# Patient Record
Sex: FEMALE | Race: BLACK OR AFRICAN AMERICAN | URBAN - METROPOLITAN AREA
[De-identification: names, ages, dates, MRNs, and addresses within clinical notes are randomized per-mention and may not be internally consistent; named-entity substitution may affect disease eponyms.]

---

## 2021-11-10 ENCOUNTER — OUT OF OFFICE VISIT (OUTPATIENT)
Dept: URBAN - METROPOLITAN AREA MEDICAL CENTER 12 | Facility: MEDICAL CENTER | Age: 21
End: 2021-11-10
Payer: COMMERCIAL

## 2021-11-10 DIAGNOSIS — R10.31 ABDOMINAL CRAMPING IN RIGHT LOWER QUADRANT: ICD-10-CM

## 2021-11-10 DIAGNOSIS — K50.90 ABDOMINAL PAIN DESPITE THERAPY FOR CROHN'S DISEASE: ICD-10-CM

## 2021-11-10 DIAGNOSIS — R93.3 ABN FINDINGS-GI TRACT: ICD-10-CM

## 2021-11-10 PROCEDURE — 99232 SBSQ HOSP IP/OBS MODERATE 35: CPT | Performed by: INTERNAL MEDICINE

## 2021-11-10 PROCEDURE — 99254 IP/OBS CNSLTJ NEW/EST MOD 60: CPT | Performed by: INTERNAL MEDICINE

## 2021-11-13 ENCOUNTER — OUT OF OFFICE VISIT (OUTPATIENT)
Dept: URBAN - METROPOLITAN AREA MEDICAL CENTER 12 | Facility: MEDICAL CENTER | Age: 21
End: 2021-11-13
Payer: COMMERCIAL

## 2021-11-13 DIAGNOSIS — R10.84 ABDOMINAL CRAMPING, GENERALIZED: ICD-10-CM

## 2021-11-13 DIAGNOSIS — K50.90 ABDOMINAL PAIN DESPITE THERAPY FOR CROHN'S DISEASE: ICD-10-CM

## 2021-11-13 DIAGNOSIS — R93.3 ABN FINDINGS-GI TRACT: ICD-10-CM

## 2021-11-13 PROCEDURE — 99232 SBSQ HOSP IP/OBS MODERATE 35: CPT | Performed by: INTERNAL MEDICINE

## 2021-11-13 PROCEDURE — 99231 SBSQ HOSP IP/OBS SF/LOW 25: CPT | Performed by: INTERNAL MEDICINE

## 2021-11-17 ENCOUNTER — TELEPHONE ENCOUNTER (OUTPATIENT)
Dept: URBAN - METROPOLITAN AREA CLINIC 98 | Facility: CLINIC | Age: 21
End: 2021-11-17

## 2021-11-17 ENCOUNTER — WEB ENCOUNTER (OUTPATIENT)
Dept: URBAN - METROPOLITAN AREA CLINIC 96 | Facility: CLINIC | Age: 21
End: 2021-11-17

## 2021-11-17 ENCOUNTER — OFFICE VISIT (OUTPATIENT)
Dept: URBAN - METROPOLITAN AREA CLINIC 98 | Facility: CLINIC | Age: 21
End: 2021-11-17
Payer: COMMERCIAL

## 2021-11-17 ENCOUNTER — OFFICE VISIT (OUTPATIENT)
Dept: URBAN - METROPOLITAN AREA CLINIC 96 | Facility: CLINIC | Age: 21
End: 2021-11-17

## 2021-11-17 VITALS
SYSTOLIC BLOOD PRESSURE: 119 MMHG | WEIGHT: 98 LBS | HEART RATE: 106 BPM | DIASTOLIC BLOOD PRESSURE: 83 MMHG | BODY MASS INDEX: 15.75 KG/M2 | TEMPERATURE: 98.6 F | HEIGHT: 66 IN

## 2021-11-17 DIAGNOSIS — K59.01 CONSTIPATION: ICD-10-CM

## 2021-11-17 DIAGNOSIS — R93.5 ABNORMAL ABDOMINAL CT SCAN: ICD-10-CM

## 2021-11-17 DIAGNOSIS — K50.813 CROHN'S DISEASE OF BOTH SMALL AND LARGE INTESTINE WITH FISTULA: ICD-10-CM

## 2021-11-17 DIAGNOSIS — R11.2 NAUSEA & VOMITING: ICD-10-CM

## 2021-11-17 PROCEDURE — 99205 OFFICE O/P NEW HI 60 MIN: CPT | Performed by: INTERNAL MEDICINE

## 2021-11-17 RX ORDER — FERROUS GLUCONATE 324 MG
1 TABLET WITH WATER OR JUICE BETWEEN MEALS TABLET ORAL ONCE A DAY
Status: ACTIVE | COMMUNITY

## 2021-11-17 RX ORDER — PREDNISONE 10 MG/1
1 TABLET TABLET ORAL ONCE A DAY
Status: ACTIVE | COMMUNITY

## 2021-11-17 RX ORDER — TRAMADOL HYDROCHLORIDE 50 MG/1
1 TABLET AS NEEDED TABLET, FILM COATED ORAL ONCE A DAY
Status: ACTIVE | COMMUNITY

## 2021-11-17 RX ORDER — FOLIC ACID 1 MG/1
1 TABLET TABLET ORAL ONCE A DAY
Qty: 30 | Refills: 11 | OUTPATIENT
Start: 2021-11-17 | End: 2022-11-12

## 2021-11-17 RX ORDER — METHOTREXATE SODIUM 2.5 MG/1
5 TAB TABLET ORAL WEEKLY
Qty: 20 | Refills: 4 | OUTPATIENT
Start: 2021-11-17

## 2021-11-17 NOTE — HPI-TODAY'S VISIT:
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 . Pt is diagnosed with CD in Aug 2021 in [place].  She was started on prednisone taper and planned to start Inflectra in 9/2021 and last dose in 10/29/2021.  She was hospitalized for a flare and was given IV steroids at this time.   . Today on 11/17/2021, pt reports that she has abdominal pain, diffuse in nature, nothing makes it better or worse.  She has nausea, but no vomiting.  Weight loss of 4 lbs (100lb to 96lb).  Has lot of constipation, has up to 1-2 BM per week   CT 11/2021: thickening of the ileum and hyperenhacement with luminal narrowing. Possible fistulous communicatin not exlcluded.   . Colonoscopy ([facility]) 9/2021: patchy colitis throughout with narrowing seen; severe ulceration in the ileum.  Biopsies only taken from colon: colitis seen, none taken from ileum.  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 11/2021: Hgb 12.9 . Fecal calprotectin >2000

## 2021-12-20 ENCOUNTER — TELEPHONE ENCOUNTER (OUTPATIENT)
Dept: URBAN - METROPOLITAN AREA CLINIC 92 | Facility: CLINIC | Age: 21
End: 2021-12-20

## 2021-12-20 ENCOUNTER — OFFICE VISIT (OUTPATIENT)
Dept: URBAN - METROPOLITAN AREA TELEHEALTH 2 | Facility: TELEHEALTH | Age: 21
End: 2021-12-20
Payer: COMMERCIAL

## 2021-12-20 DIAGNOSIS — R93.5 ABNORMAL ABDOMINAL CT SCAN: ICD-10-CM

## 2021-12-20 DIAGNOSIS — K50.813 CROHN'S DISEASE OF BOTH SMALL AND LARGE INTESTINE WITH FISTULA: ICD-10-CM

## 2021-12-20 DIAGNOSIS — K59.01 CONSTIPATION: ICD-10-CM

## 2021-12-20 PROCEDURE — 99215 OFFICE O/P EST HI 40 MIN: CPT | Performed by: INTERNAL MEDICINE

## 2021-12-20 RX ORDER — TRAMADOL HYDROCHLORIDE 50 MG/1
1 TABLET AS NEEDED TABLET, FILM COATED ORAL ONCE A DAY
Status: ACTIVE | COMMUNITY

## 2021-12-20 RX ORDER — FOLIC ACID 1 MG/1
1 TABLET TABLET ORAL ONCE A DAY
Qty: 30 | Refills: 11 | OUTPATIENT

## 2021-12-20 RX ORDER — METHOTREXATE SODIUM 2.5 MG/1
5 TAB TABLET ORAL WEEKLY
Qty: 20 | Refills: 4 | Status: ACTIVE | COMMUNITY
Start: 2021-11-17

## 2021-12-20 RX ORDER — FERROUS GLUCONATE 324 MG
1 TABLET WITH WATER OR JUICE BETWEEN MEALS TABLET ORAL ONCE A DAY
Status: ACTIVE | COMMUNITY

## 2021-12-20 RX ORDER — INFLIXIMAB 100 MG/10ML
AS DIRECTED INJECTION, POWDER, LYOPHILIZED, FOR SOLUTION INTRAVENOUS
Qty: 100 MILLIGRAMS | Refills: 0 | OUTPATIENT
Start: 2021-12-20 | End: 2022-01-19

## 2021-12-20 RX ORDER — PREDNISONE 10 MG/1
1 TABLET TABLET ORAL ONCE A DAY
Status: ACTIVE | COMMUNITY

## 2021-12-20 RX ORDER — FOLIC ACID 1 MG/1
1 TABLET TABLET ORAL ONCE A DAY
Qty: 30 | Refills: 11 | Status: ACTIVE | COMMUNITY
Start: 2021-11-17 | End: 2022-11-12

## 2021-12-20 RX ORDER — METHOTREXATE SODIUM 2.5 MG/1
5 TAB TABLET ORAL WEEKLY
Qty: 20 | Refills: 4 | OUTPATIENT

## 2021-12-20 NOTE — HPI-TODAY'S VISIT:
Avinash Maurice is a 19 y/o indiv with ileal CD, currently on inflectra and MTX (12.5mg weekly started 11/2021), here for evalaution of refractory dz. . Pt is diagnosed with CD in Aug 2021 in [place].  She was started on prednisone taper and planned to start Inflectra in 9/2021 and last dose in 10/29/2021.  She was hospitalized for a flare and was given IV steroids at this time.  She had right sided resection performed on 12/2021. . Previously on 11/17/2021, pt reports that she has abdominal pain, diffuse in nature, nothing makes it better or worse.  She has nausea, but no vomiting.  Weight loss of 4 lbs (100lb to 96lb).  Has lot of constipation, has up to 1-2 BM per week  . Today on 12/20/2021, pt reports that she had surgery last week and is doing very good.  Is discharged from hospital.  Is still having some pain since surgery.  Had 1 BM today.  No blood in the stool. . CT 11/2021: thickening of the ileum and hyperenhacement with luminal narrowing. Possible fistulous communicatin not exlcluded.   . Colonoscopy ([facility]) 9/2021: patchy colitis throughout with narrowing seen; severe ulceration in the ileum.  Biopsies only taken from colon: colitis seen, none taken from ileum.  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 11/2021: Hgb 12.9 . Fecal calprotectin >2000 .

## 2022-02-03 LAB
A/G RATIO: 1.6
ALBUMIN: 4.6
ALKALINE PHOSPHATASE: 87
ALT (SGPT): 6
AST (SGOT): 12
BASO (ABSOLUTE): 0
BASOS: 1
BILIRUBIN, TOTAL: 0.3
BUN/CREATININE RATIO: 17
BUN: 12
CALCIUM: 9.1
CARBON DIOXIDE, TOTAL: 20
CHLORIDE: 99
CREATININE: 0.7
EGFR IF AFRICN AM: 144
EGFR IF NONAFRICN AM: 125
EOS (ABSOLUTE): 0.2
EOS: 3
GLOBULIN, TOTAL: 2.8
GLUCOSE: 74
HBSAG SCREEN: NEGATIVE
HEMATOCRIT: 36.7
HEMATOLOGY COMMENTS:: (no result)
HEMOGLOBIN: 11.4
HEP B CORE AB, TOT: NEGATIVE
HEPATITIS B SURF AB QUANT: 3.9
IMMATURE CELLS: (no result)
IMMATURE GRANS (ABS): 0
IMMATURE GRANULOCYTES: 0
LYMPHS (ABSOLUTE): 1.7
LYMPHS: 27
MCH: 26.3
MCHC: 31.1
MCV: 85
MONOCYTES(ABSOLUTE): 0.5
MONOCYTES: 7
NEUTROPHILS (ABSOLUTE): 3.8
NEUTROPHILS: 62
NRBC: (no result)
PLATELETS: 414
POTASSIUM: 4.2
PROTEIN, TOTAL: 7.4
QUANTIFERON CRITERIA: (no result)
QUANTIFERON INCUBATION: (no result)
QUANTIFERON MITOGEN VALUE: >10
QUANTIFERON NIL VALUE: 0.06
QUANTIFERON TB1 AG VALUE: 0.07
QUANTIFERON TB2 AG VALUE: 0.07
QUANTIFERON-TB GOLD PLUS: NEGATIVE
RBC: 4.34
RDW: 14.8
SODIUM: 138
WBC: 6.2

## 2022-02-17 ENCOUNTER — OFFICE VISIT (OUTPATIENT)
Dept: URBAN - METROPOLITAN AREA CLINIC 97 | Facility: CLINIC | Age: 22
End: 2022-02-17
Payer: COMMERCIAL

## 2022-02-17 ENCOUNTER — TELEPHONE ENCOUNTER (OUTPATIENT)
Dept: URBAN - METROPOLITAN AREA CLINIC 97 | Facility: CLINIC | Age: 22
End: 2022-02-17

## 2022-02-17 VITALS
DIASTOLIC BLOOD PRESSURE: 74 MMHG | WEIGHT: 102 LBS | HEART RATE: 87 BPM | BODY MASS INDEX: 16.39 KG/M2 | SYSTOLIC BLOOD PRESSURE: 111 MMHG | RESPIRATION RATE: 16 BRPM | HEIGHT: 66 IN | TEMPERATURE: 97.2 F

## 2022-02-17 DIAGNOSIS — K50.80 CROHN'S COLITIS: ICD-10-CM

## 2022-02-17 PROCEDURE — 96415 CHEMO IV INFUSION ADDL HR: CPT | Performed by: INTERNAL MEDICINE

## 2022-02-17 PROCEDURE — 96375 TX/PRO/DX INJ NEW DRUG ADDON: CPT | Performed by: INTERNAL MEDICINE

## 2022-02-17 PROCEDURE — 96413 CHEMO IV INFUSION 1 HR: CPT | Performed by: INTERNAL MEDICINE

## 2022-02-17 RX ORDER — PREDNISONE 10 MG/1
1 TABLET TABLET ORAL ONCE A DAY
Status: ACTIVE | COMMUNITY

## 2022-02-17 RX ORDER — FOLIC ACID 1 MG/1
1 TABLET TABLET ORAL ONCE A DAY
Qty: 30 | Refills: 11 | Status: ACTIVE | COMMUNITY

## 2022-02-17 RX ORDER — FERROUS GLUCONATE 324 MG
1 TABLET WITH WATER OR JUICE BETWEEN MEALS TABLET ORAL ONCE A DAY
Status: ACTIVE | COMMUNITY

## 2022-02-17 RX ORDER — METHOTREXATE SODIUM 2.5 MG/1
5 TAB TABLET ORAL WEEKLY
Qty: 20 | Refills: 4 | Status: ACTIVE | COMMUNITY

## 2022-02-17 RX ORDER — TRAMADOL HYDROCHLORIDE 50 MG/1
1 TABLET AS NEEDED TABLET, FILM COATED ORAL ONCE A DAY
Status: ACTIVE | COMMUNITY

## 2022-03-23 ENCOUNTER — OFFICE VISIT (OUTPATIENT)
Dept: URBAN - METROPOLITAN AREA CLINIC 96 | Facility: CLINIC | Age: 22
End: 2022-03-23
Payer: COMMERCIAL

## 2022-03-23 ENCOUNTER — TELEPHONE ENCOUNTER (OUTPATIENT)
Dept: URBAN - METROPOLITAN AREA CLINIC 92 | Facility: CLINIC | Age: 22
End: 2022-03-23

## 2022-03-23 DIAGNOSIS — L65.9 HAIR LOSS: ICD-10-CM

## 2022-03-23 DIAGNOSIS — K59.01 CONSTIPATION: ICD-10-CM

## 2022-03-23 DIAGNOSIS — T50.905A ADVERSE EFFECT OF DRUG, INITIAL ENCOUNTER: ICD-10-CM

## 2022-03-23 DIAGNOSIS — R93.5 ABNORMAL ABDOMINAL CT SCAN: ICD-10-CM

## 2022-03-23 DIAGNOSIS — K50.813 CROHN'S DISEASE OF BOTH SMALL AND LARGE INTESTINE WITH FISTULA: ICD-10-CM

## 2022-03-23 PROCEDURE — 99215 OFFICE O/P EST HI 40 MIN: CPT | Performed by: INTERNAL MEDICINE

## 2022-03-23 RX ORDER — USTEKINUMAB 130 MG/26ML
AS DIRECTED SOLUTION INTRAVENOUS ONCE
Qty: 260 MILLIGRAMS | Refills: 0 | OUTPATIENT
Start: 2022-03-23 | End: 2022-03-24

## 2022-03-23 RX ORDER — FOLIC ACID 1 MG/1
1 TABLET TABLET ORAL ONCE A DAY
Qty: 30 | Refills: 11 | OUTPATIENT

## 2022-03-23 RX ORDER — FOLIC ACID 1 MG/1
1 TABLET TABLET ORAL ONCE A DAY
Qty: 30 | Refills: 11 | COMMUNITY

## 2022-03-23 RX ORDER — FERROUS GLUCONATE 324 MG
1 TABLET WITH WATER OR JUICE BETWEEN MEALS TABLET ORAL ONCE A DAY
COMMUNITY

## 2022-03-23 RX ORDER — METHOTREXATE SODIUM 2.5 MG/1
5 TAB TABLET ORAL WEEKLY
Qty: 20 | Refills: 4 | COMMUNITY

## 2022-03-23 RX ORDER — HYDROCORTISONE SODIUM SUCCINATE 100 MG/2ML
AS DIRECTED INJECTION, POWDER, FOR SOLUTION INTRAMUSCULAR; INTRAVENOUS
Qty: 100 MILLIGRAM | Refills: 0 | OUTPATIENT
Start: 2022-05-31 | End: 2022-06-01

## 2022-03-23 RX ORDER — USTEKINUMAB 90 MG/ML
1 INJECTION INJECTION, SOLUTION SUBCUTANEOUS ONCE
Qty: 1 | Refills: 11 | OUTPATIENT
Start: 2022-03-23 | End: 2024-01-23

## 2022-03-23 RX ORDER — TRAMADOL HYDROCHLORIDE 50 MG/1
1 TABLET AS NEEDED TABLET, FILM COATED ORAL ONCE A DAY
COMMUNITY

## 2022-03-23 RX ORDER — METHOTREXATE SODIUM 2.5 MG/1
5 TAB TABLET ORAL WEEKLY
Qty: 20 | Refills: 4 | OUTPATIENT

## 2022-03-23 RX ORDER — DIPHENHYDRAMINE HCL 2 %
1 CAPSULE AT BEDTIME AS NEEDED CREAM (GRAM) TOPICAL ONCE A DAY
Qty: 30 | Refills: 0 | OUTPATIENT
Start: 2022-05-31 | End: 2022-06-30

## 2022-03-23 RX ORDER — PREDNISONE 10 MG/1
1 TABLET TABLET ORAL ONCE A DAY
COMMUNITY

## 2022-03-23 RX ORDER — ACETAMINOPHEN 650 MG
2 TABLETS AS NEEDED TABLET, EXTENDED RELEASE ORAL
Qty: 6 TABLET | Refills: 0 | OUTPATIENT
Start: 2022-05-31 | End: 2022-06-01

## 2022-03-23 NOTE — HPI-TODAY'S VISIT:
Avinash Maurice is a 19 y/o indiv with ileal CD, currently on inflectra and MTX (12.5mg weekly started 11/2021), here for evalaution of refractory dz. . Pt is diagnosed with CD in Aug 2021 in [place].  She was started on prednisone taper and planned to start Inflectra in 9/2021 and last dose in 10/29/2021.  She was hospitalized for a flare and was given IV steroids at this time.  She had right sided resection performed on 12/2021. . Previously on 11/17/2021, pt reports that she has abdominal pain, diffuse in nature, nothing makes it better or worse.  She has nausea, but no vomiting.  Weight loss of 4 lbs (100lb to 96lb).  Has lot of constipation, has up to 1-2 BM per week  . Previously on 12/20/2021, pt reports that she had surgery last week and is doing very good.  Is discharged from hospital.  Is still having some pain since surgery.  Had 1 BM today.  No blood in the stool. . Today on 3/23/2022, pt reports that she had some mild itchiness on her left arm (where was the infusion was going through); gave some steroid/IVF/Tylenol, which made her feel better.  She is also having swelling in her joints, knees/hands.  Enlarged lymph node behind her ear as well.  All started and got worse around time of her Inflectra infusion. . CT 11/2021: thickening of the ileum and hyperenhacement with luminal narrowing. Possible fistulous communicatin not exlcluded.   . Colonoscopy ([facility]) 9/2021: patchy colitis throughout with narrowing seen; severe ulceration in the ileum.  Biopsies only taken from colon: colitis seen, none taken from ileum.  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 11/2021: Hgb 12.9 . Fecal calprotectin >2000 .

## 2022-03-24 LAB
A/G RATIO: 2
ALBUMIN: 4.7
ALKALINE PHOSPHATASE: 74
ALT (SGPT): 15
AST (SGOT): 16
BASO (ABSOLUTE): 0.1
BASOS: 1
BILIRUBIN, TOTAL: 0.4
BUN/CREATININE RATIO: 14
BUN: 10
CALCIUM: 9.4
CARBON DIOXIDE, TOTAL: 22
CHLORIDE: 102
CREATININE: 0.72
EGFR: 123
EOS (ABSOLUTE): 0.1
EOS: 2
GLOBULIN, TOTAL: 2.3
GLUCOSE: 79
HEMATOCRIT: 32.9
HEMATOLOGY COMMENTS:: (no result)
HEMOGLOBIN: 10.6
IMMATURE CELLS: (no result)
IMMATURE GRANS (ABS): 0
IMMATURE GRANULOCYTES: 0
LYMPHS (ABSOLUTE): 1.6
LYMPHS: 34
MCH: 26.8
MCHC: 32.2
MCV: 83
MONOCYTES(ABSOLUTE): 0.4
MONOCYTES: 8
NEUTROPHILS (ABSOLUTE): 2.6
NEUTROPHILS: 55
NRBC: (no result)
PLATELETS: 432
POTASSIUM: 3.9
PROTEIN, TOTAL: 7
RBC: 3.95
RDW: 14.1
SODIUM: 142
WBC: 4.6

## 2022-04-14 ENCOUNTER — OFFICE VISIT (OUTPATIENT)
Dept: URBAN - METROPOLITAN AREA CLINIC 97 | Facility: CLINIC | Age: 22
End: 2022-04-14

## 2022-05-10 ENCOUNTER — TELEPHONE ENCOUNTER (OUTPATIENT)
Dept: URBAN - METROPOLITAN AREA CLINIC 98 | Facility: CLINIC | Age: 22
End: 2022-05-10

## 2022-05-19 ENCOUNTER — OFFICE VISIT (OUTPATIENT)
Dept: URBAN - METROPOLITAN AREA CLINIC 97 | Facility: CLINIC | Age: 22
End: 2022-05-19
Payer: COMMERCIAL

## 2022-05-19 ENCOUNTER — TELEPHONE ENCOUNTER (OUTPATIENT)
Dept: URBAN - METROPOLITAN AREA CLINIC 97 | Facility: CLINIC | Age: 22
End: 2022-05-19

## 2022-05-19 VITALS
HEIGHT: 66 IN | TEMPERATURE: 97.2 F | DIASTOLIC BLOOD PRESSURE: 65 MMHG | BODY MASS INDEX: 17.68 KG/M2 | WEIGHT: 110 LBS | RESPIRATION RATE: 16 BRPM | SYSTOLIC BLOOD PRESSURE: 110 MMHG | HEART RATE: 72 BPM

## 2022-05-19 DIAGNOSIS — K50.80 CROHN'S COLITIS: ICD-10-CM

## 2022-05-19 PROCEDURE — 96375 TX/PRO/DX INJ NEW DRUG ADDON: CPT | Performed by: INTERNAL MEDICINE

## 2022-05-19 PROCEDURE — 96413 CHEMO IV INFUSION 1 HR: CPT | Performed by: INTERNAL MEDICINE

## 2022-05-19 RX ORDER — METHOTREXATE SODIUM 2.5 MG/1
5 TAB TABLET ORAL WEEKLY
Qty: 20 | Refills: 4 | Status: ACTIVE | COMMUNITY

## 2022-05-19 RX ORDER — USTEKINUMAB 90 MG/ML
1 INJECTION INJECTION, SOLUTION SUBCUTANEOUS ONCE
Qty: 1 | Refills: 11 | Status: ACTIVE | COMMUNITY
Start: 2022-03-23 | End: 2024-01-23

## 2022-05-19 RX ORDER — TRAMADOL HYDROCHLORIDE 50 MG/1
1 TABLET AS NEEDED TABLET, FILM COATED ORAL ONCE A DAY
COMMUNITY

## 2022-05-19 RX ORDER — PREDNISONE 10 MG/1
1 TABLET TABLET ORAL ONCE A DAY
COMMUNITY

## 2022-05-19 RX ORDER — FOLIC ACID 1 MG/1
1 TABLET TABLET ORAL ONCE A DAY
Qty: 30 | Refills: 11 | Status: ACTIVE | COMMUNITY

## 2022-05-19 RX ORDER — FERROUS GLUCONATE 324 MG
1 TABLET WITH WATER OR JUICE BETWEEN MEALS TABLET ORAL ONCE A DAY
COMMUNITY

## 2022-06-30 ENCOUNTER — TELEPHONE ENCOUNTER (OUTPATIENT)
Dept: URBAN - METROPOLITAN AREA CLINIC 92 | Facility: CLINIC | Age: 22
End: 2022-06-30

## 2022-07-07 NOTE — PHYSICAL EXAM NECK/THYROID:
normal appearance , without tenderness upon palpation , no deformities , trachea midline , Thyroid normal size , no thyroid nodules , no masses , no JVD , thyroid nontender Name band;

## 2022-07-08 ENCOUNTER — TELEPHONE ENCOUNTER (OUTPATIENT)
Dept: URBAN - METROPOLITAN AREA CLINIC 92 | Facility: CLINIC | Age: 22
End: 2022-07-08

## 2022-07-13 PROBLEM — 71833008 CROHN'S DISEASE OF SMALL AND LARGE INTESTINES: Status: ACTIVE | Noted: 2022-07-13

## 2022-09-09 ENCOUNTER — TELEPHONE ENCOUNTER (OUTPATIENT)
Dept: URBAN - METROPOLITAN AREA CLINIC 95 | Facility: CLINIC | Age: 22
End: 2022-09-09

## 2022-09-30 ENCOUNTER — CLAIMS CREATED FROM THE CLAIM WINDOW (OUTPATIENT)
Dept: URBAN - METROPOLITAN AREA TELEHEALTH 2 | Facility: TELEHEALTH | Age: 22
End: 2022-09-30
Payer: COMMERCIAL

## 2022-09-30 ENCOUNTER — DASHBOARD ENCOUNTERS (OUTPATIENT)
Age: 22
End: 2022-09-30

## 2022-09-30 ENCOUNTER — TELEPHONE ENCOUNTER (OUTPATIENT)
Dept: URBAN - METROPOLITAN AREA CLINIC 92 | Facility: CLINIC | Age: 22
End: 2022-09-30

## 2022-09-30 VITALS — HEIGHT: 66 IN

## 2022-09-30 DIAGNOSIS — K50.813 CROHN'S DISEASE OF BOTH SMALL AND LARGE INTESTINE WITH FISTULA: ICD-10-CM

## 2022-09-30 DIAGNOSIS — L65.9 HAIR LOSS: ICD-10-CM

## 2022-09-30 DIAGNOSIS — K59.01 CONSTIPATION: ICD-10-CM

## 2022-09-30 DIAGNOSIS — M25.50 JOINT PAIN: ICD-10-CM

## 2022-09-30 DIAGNOSIS — R93.5 ABNORMAL ABDOMINAL CT SCAN: ICD-10-CM

## 2022-09-30 DIAGNOSIS — T50.905A ADVERSE EFFECT OF DRUG, INITIAL ENCOUNTER: ICD-10-CM

## 2022-09-30 PROBLEM — 1085821000119102 FISTULA OF INTESTINE DUE TO CROHN'S DISEASE OF SMALL AND LARGE INTESTINE: Status: ACTIVE | Noted: 2021-11-17

## 2022-09-30 PROBLEM — 14760008 CONSTIPATION: Status: ACTIVE | Noted: 2021-11-17

## 2022-09-30 PROCEDURE — 99215 OFFICE O/P EST HI 40 MIN: CPT | Performed by: INTERNAL MEDICINE

## 2022-09-30 RX ORDER — FERROUS GLUCONATE 324 MG
1 TABLET WITH WATER OR JUICE BETWEEN MEALS TABLET ORAL ONCE A DAY
COMMUNITY

## 2022-09-30 RX ORDER — METHOTREXATE SODIUM 2.5 MG/1
5 TAB TABLET ORAL WEEKLY
Qty: 20 | Refills: 4 | OUTPATIENT

## 2022-09-30 RX ORDER — PREDNISONE 10 MG/1
1 TABLET TABLET ORAL ONCE A DAY
COMMUNITY

## 2022-09-30 RX ORDER — METHOTREXATE SODIUM 2.5 MG/1
5 TAB TABLET ORAL WEEKLY
Qty: 20 | Refills: 4 | Status: ACTIVE | COMMUNITY

## 2022-09-30 RX ORDER — SULFASALAZINE 500 MG/1
4 TABLET TABLET ORAL ONCE A DAY
Qty: 120 TABLET | Refills: 4 | OUTPATIENT
Start: 2022-09-30 | End: 2023-02-26

## 2022-09-30 RX ORDER — TRAMADOL HYDROCHLORIDE 50 MG/1
1 TABLET AS NEEDED TABLET, FILM COATED ORAL ONCE A DAY
COMMUNITY

## 2022-09-30 RX ORDER — FOLIC ACID 1 MG/1
1 TABLET TABLET ORAL ONCE A DAY
Qty: 30 | Refills: 11 | Status: ACTIVE | COMMUNITY

## 2022-09-30 RX ORDER — USTEKINUMAB 90 MG/ML
1 INJECTION INJECTION, SOLUTION SUBCUTANEOUS ONCE
Qty: 1 | Refills: 11 | OUTPATIENT

## 2022-09-30 RX ORDER — USTEKINUMAB 90 MG/ML
1 INJECTION INJECTION, SOLUTION SUBCUTANEOUS ONCE
Qty: 1 | Refills: 11 | Status: ACTIVE | COMMUNITY
Start: 2022-03-23 | End: 2024-01-23

## 2022-09-30 RX ORDER — FOLIC ACID 1 MG/1
1 TABLET TABLET ORAL ONCE A DAY
Qty: 30 | Refills: 11 | OUTPATIENT

## 2022-09-30 NOTE — HPI-TODAY'S VISIT:
Pt Josafat is a 20 y/o indiv with ileal CD, currently on Stelara (started 4/2022) and MTX (12.5mg weekly started 11/2021), here for evalaution of refractory dz. . Pt is diagnosed with CD in Aug 2021 in [place].  She was started on prednisone taper and planned to start Inflectra in 9/2021 and last dose in 10/29/2021.  She was hospitalized for a flare and was given IV steroids at this time.  She had right sided resection performed on 12/2021.  She had rash develop while on inflectra, thus stopped.  We started on Stelara in 4/2022. . Previously on 11/17/2021, pt reports that she has abdominal pain, diffuse in nature, nothing makes it better or worse.  She has nausea, but no vomiting.  Weight loss of 4 lbs (100lb to 96lb).  Has lot of constipation, has up to 1-2 BM per week  . Previously on 12/20/2021, pt reports that she had surgery last week and is doing very good.  Is discharged from hospital.  Is still having some pain since surgery.  Had 1 BM today.  No blood in the stool. . Previously on 3/23/2022, pt reports that she had some mild itchiness on her left arm (where was the infusion was going through); gave some steroid/IVF/Tylenol, which made her feel better.  She is also having swelling in her joints, knees/hands.  Enlarged lymph node behind her ear as well.  All started and got worse around time of her Inflectra infusion. . Today on 9/30/2022, pt reports that after she took her last dose of Stelara, she has noticed some URI sxs.  Main issue is that knees and elbows are still swelling and more painful, not much worse compared to when she was on inflectra.  Her BM frequency is improved.  Some menstrual irregularity on MTX, but nothing too drastic.  CT 11/2021: thickening of the ileum and hyperenhacement with luminal narrowing. Possible fistulous communicatin not exlcluded.   . Colonoscopy ([facility]) 9/2021: patchy colitis throughout with narrowing seen; severe ulceration in the ileum.  Biopsies only taken from colon: colitis seen, none taken from ileum.  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 11/2021: Hgb 12.9 . Fecal calprotectin >2000 .

## 2023-04-17 ENCOUNTER — TELEPHONE ENCOUNTER (OUTPATIENT)
Dept: URBAN - METROPOLITAN AREA CLINIC 96 | Facility: CLINIC | Age: 23
End: 2023-04-17

## 2023-04-17 RX ORDER — FOLIC ACID 1 MG/1
1 TABLET TABLET ORAL ONCE A DAY
Qty: 30 | Refills: 11
End: 2024-04-12

## 2023-04-17 RX ORDER — METHOTREXATE SODIUM 2.5 MG/1
5 TAB TABLET ORAL WEEKLY
Qty: 20 | Refills: 4